# Patient Record
Sex: MALE | URBAN - METROPOLITAN AREA
[De-identification: names, ages, dates, MRNs, and addresses within clinical notes are randomized per-mention and may not be internally consistent; named-entity substitution may affect disease eponyms.]

---

## 2023-07-25 ENCOUNTER — APPOINTMENT (RX ONLY)
Dept: URBAN - METROPOLITAN AREA CLINIC 26 | Facility: CLINIC | Age: 68
Setting detail: DERMATOLOGY
End: 2023-07-25

## 2023-07-25 DIAGNOSIS — D485 NEOPLASM OF UNCERTAIN BEHAVIOR OF SKIN: ICD-10-CM

## 2023-07-25 PROBLEM — D48.5 NEOPLASM OF UNCERTAIN BEHAVIOR OF SKIN: Status: ACTIVE | Noted: 2023-07-25

## 2023-07-25 PROCEDURE — ? EXCISION

## 2023-07-25 PROCEDURE — 11402 EXC TR-EXT B9+MARG 1.1-2 CM: CPT

## 2023-07-25 PROCEDURE — 12032 INTMD RPR S/A/T/EXT 2.6-7.5: CPT

## 2023-07-25 ASSESSMENT — LOCATION SIMPLE DESCRIPTION DERM: LOCATION SIMPLE: RIGHT LOWER BACK

## 2023-07-25 ASSESSMENT — LOCATION ZONE DERM: LOCATION ZONE: TRUNK

## 2023-07-25 ASSESSMENT — LOCATION DETAILED DESCRIPTION DERM: LOCATION DETAILED: RIGHT SUPERIOR LATERAL LOWER BACK

## 2024-06-06 ENCOUNTER — TRANSCRIBE ORDERS (OUTPATIENT)
Dept: REGISTRATION | Age: 69
End: 2024-06-06

## 2024-06-06 DIAGNOSIS — R97.20 ELEVATED PROSTATE SPECIFIC ANTIGEN (PSA): Primary | ICD-10-CM

## 2024-06-06 DIAGNOSIS — N40.1 BENIGN PROSTATIC HYPERPLASIA WITH LOWER URINARY TRACT SYMPTOMS: ICD-10-CM

## 2024-06-06 DIAGNOSIS — R35.1 NOCTURIA: ICD-10-CM

## 2024-06-07 ENCOUNTER — TRANSCRIBE ORDERS (OUTPATIENT)
Dept: SCHEDULING | Age: 69
End: 2024-06-07

## 2024-06-07 DIAGNOSIS — R97.20 ELEVATED PROSTATE SPECIFIC ANTIGEN (PSA): Primary | ICD-10-CM

## 2024-06-07 DIAGNOSIS — R35.1 NOCTURIA: ICD-10-CM

## 2024-06-07 DIAGNOSIS — N40.1 BENIGN PROSTATIC HYPERPLASIA WITH LOWER URINARY TRACT SYMPTOMS: ICD-10-CM

## 2024-06-11 ENCOUNTER — APPOINTMENT (RX ONLY)
Dept: URBAN - METROPOLITAN AREA CLINIC 374 | Facility: CLINIC | Age: 69
Setting detail: DERMATOLOGY
End: 2024-06-11

## 2024-06-11 DIAGNOSIS — L82.1 OTHER SEBORRHEIC KERATOSIS: ICD-10-CM

## 2024-06-11 DIAGNOSIS — L21.8 OTHER SEBORRHEIC DERMATITIS: ICD-10-CM

## 2024-06-11 DIAGNOSIS — D22 MELANOCYTIC NEVI: ICD-10-CM

## 2024-06-11 DIAGNOSIS — D18.0 HEMANGIOMA: ICD-10-CM

## 2024-06-11 DIAGNOSIS — L01.01 NON-BULLOUS IMPETIGO: ICD-10-CM | Status: INADEQUATELY CONTROLLED

## 2024-06-11 DIAGNOSIS — Z71.89 OTHER SPECIFIED COUNSELING: ICD-10-CM

## 2024-06-11 DIAGNOSIS — B07.8 OTHER VIRAL WARTS: ICD-10-CM | Status: INADEQUATELY CONTROLLED

## 2024-06-11 DIAGNOSIS — L81.4 OTHER MELANIN HYPERPIGMENTATION: ICD-10-CM

## 2024-06-11 PROBLEM — D22.5 MELANOCYTIC NEVI OF TRUNK: Status: ACTIVE | Noted: 2024-06-11

## 2024-06-11 PROBLEM — D18.01 HEMANGIOMA OF SKIN AND SUBCUTANEOUS TISSUE: Status: ACTIVE | Noted: 2024-06-11

## 2024-06-11 PROCEDURE — ? TREATMENT REGIMEN

## 2024-06-11 PROCEDURE — 17110 DESTRUCTION B9 LES UP TO 14: CPT

## 2024-06-11 PROCEDURE — 99214 OFFICE O/P EST MOD 30 MIN: CPT | Mod: 25

## 2024-06-11 PROCEDURE — ? LIQUID NITROGEN

## 2024-06-11 PROCEDURE — ? COUNSELING

## 2024-06-11 PROCEDURE — ? TREATMENT GOALS

## 2024-06-11 PROCEDURE — ? PHOTO-DOCUMENTATION

## 2024-06-11 PROCEDURE — ? PRESCRIPTION MEDICATION MANAGEMENT

## 2024-06-11 PROCEDURE — ? FULL BODY SKIN EXAM

## 2024-06-11 PROCEDURE — ? PRESCRIPTION

## 2024-06-11 RX ORDER — KETOCONAZOLE 20 MG/G
CREAM TOPICAL BID
Qty: 60 | Refills: 3 | Status: ERX | COMMUNITY
Start: 2024-06-11

## 2024-06-11 RX ORDER — KETOCONAZOLE 20 MG/ML
SHAMPOO, SUSPENSION TOPICAL
Qty: 120 | Refills: 2 | Status: ERX | COMMUNITY
Start: 2024-06-11

## 2024-06-11 RX ORDER — HYDROCORTISONE 25 MG/G
CREAM TOPICAL
Qty: 28 | Refills: 2 | Status: ERX | COMMUNITY
Start: 2024-06-11

## 2024-06-11 RX ORDER — MUPIROCIN 20 MG/G
1 OINTMENT TOPICAL BID
Qty: 22 | Refills: 0 | Status: ERX | COMMUNITY
Start: 2024-06-11

## 2024-06-11 RX ADMIN — KETOCONAZOLE: 20 CREAM TOPICAL at 00:00

## 2024-06-11 RX ADMIN — KETOCONAZOLE: 20 SHAMPOO, SUSPENSION TOPICAL at 00:00

## 2024-06-11 RX ADMIN — HYDROCORTISONE: 25 CREAM TOPICAL at 00:00

## 2024-06-11 RX ADMIN — MUPIROCIN 1: 20 OINTMENT TOPICAL at 00:00

## 2024-06-11 ASSESSMENT — LOCATION ZONE DERM
LOCATION ZONE: ARM
LOCATION ZONE: FACE
LOCATION ZONE: TRUNK
LOCATION ZONE: SCALP

## 2024-06-11 ASSESSMENT — LOCATION DETAILED DESCRIPTION DERM
LOCATION DETAILED: LEFT SUPERIOR PARIETAL SCALP
LOCATION DETAILED: SUPERIOR THORACIC SPINE
LOCATION DETAILED: LEFT CENTRAL MALAR CHEEK
LOCATION DETAILED: STERNUM
LOCATION DETAILED: INFERIOR THORACIC SPINE
LOCATION DETAILED: RIGHT MEDIAL FOREHEAD
LOCATION DETAILED: EPIGASTRIC SKIN
LOCATION DETAILED: RIGHT INFERIOR MEDIAL FOREHEAD
LOCATION DETAILED: RIGHT ANTERIOR SHOULDER
LOCATION DETAILED: RIGHT INFERIOR MEDIAL UPPER BACK
LOCATION DETAILED: UPPER STERNUM

## 2024-06-11 ASSESSMENT — LOCATION SIMPLE DESCRIPTION DERM
LOCATION SIMPLE: UPPER BACK
LOCATION SIMPLE: RIGHT FOREHEAD
LOCATION SIMPLE: CHEST
LOCATION SIMPLE: SCALP
LOCATION SIMPLE: ABDOMEN
LOCATION SIMPLE: RIGHT SHOULDER
LOCATION SIMPLE: LEFT CHEEK
LOCATION SIMPLE: RIGHT UPPER BACK

## 2024-06-11 NOTE — PROCEDURE: PRESCRIPTION MEDICATION MANAGEMENT
Render In Strict Bullet Format?: No
Plan: f/u 2 weeks
Detail Level: Zone
Initiate Treatment: mupirocin 2 % topical ointment BID: Apply a thin layer to the affected area of the chest BID x 5 days
Initiate Treatment: ketoconazole 2 % shampoo TP Sig: Lather into scalp at each wash, let sit for 5 minutes then rinse out. Use 2-3x/week\\n\\nketoconazole 2 % topical cream TP Frequency: BID Sig: Apply a thin layer to the face BID, mixed with hydrocortisone 2.5% cream until clear then twice weekly for maintenance\\n\\nhydrocortisone 2.5 % topical cream TP Sig: Apply a thin layer to AA of the face BID mixed with ketoconazole cream x 2 weeks when flaky/flaring, then PRN flares

## 2024-06-11 NOTE — PROCEDURE: LIQUID NITROGEN
Consent: The patient's consent was obtained including but not limited to risks of crusting, scabbing, blistering, scarring, darker or lighter pigmentary change, recurrence, incomplete removal and infection.
Render Note In Bullet Format When Appropriate: No
Detail Level: Detailed
Show Aperture Variable?: Yes
Spray Paint Text: The liquid nitrogen was applied to the skin utilizing a spray paint frosting technique.
Number Of Freeze-Thaw Cycles: 3 freeze-thaw cycles
Medical Necessity Clause: This procedure was medically necessary because the lesions that were treated were:
Duration Of Freeze Thaw-Cycle (Seconds): 3
Post-Care Instructions: I reviewed with the patient in detail post-care instructions. Patient is to wear sunprotection, and avoid picking at any of the treated lesions. Pt may apply Vaseline to crusted or scabbing areas.
Medical Necessity Information: It is in your best interest to select a reason for this procedure from the list below. All of these items fulfill various CMS LCD requirements except the new and changing color options.

## 2024-06-25 ENCOUNTER — APPOINTMENT (RX ONLY)
Dept: URBAN - METROPOLITAN AREA CLINIC 374 | Facility: CLINIC | Age: 69
Setting detail: DERMATOLOGY
End: 2024-06-25

## 2024-06-25 DIAGNOSIS — D22 MELANOCYTIC NEVI: ICD-10-CM | Status: INADEQUATELY CONTROLLED

## 2024-06-25 DIAGNOSIS — L01.01 NON-BULLOUS IMPETIGO: ICD-10-CM | Status: RESOLVED

## 2024-06-25 PROBLEM — D22.5 MELANOCYTIC NEVI OF TRUNK: Status: ACTIVE | Noted: 2024-06-25

## 2024-06-25 PROCEDURE — 99213 OFFICE O/P EST LOW 20 MIN: CPT | Mod: 25

## 2024-06-25 PROCEDURE — ? COUNSELING

## 2024-06-25 PROCEDURE — ? PRESCRIPTION MEDICATION MANAGEMENT

## 2024-06-25 PROCEDURE — 11301 SHAVE SKIN LESION 0.6-1.0 CM: CPT

## 2024-06-25 PROCEDURE — ? SHAVE REMOVAL

## 2024-06-25 PROCEDURE — ? PHOTO-DOCUMENTATION

## 2024-06-25 ASSESSMENT — LOCATION SIMPLE DESCRIPTION DERM: LOCATION SIMPLE: CHEST

## 2024-06-25 ASSESSMENT — LOCATION ZONE DERM: LOCATION ZONE: TRUNK

## 2024-06-25 ASSESSMENT — LOCATION DETAILED DESCRIPTION DERM
LOCATION DETAILED: LEFT MEDIAL INFERIOR CHEST
LOCATION DETAILED: STERNUM

## 2024-06-25 NOTE — PROCEDURE: SHAVE REMOVAL
Medical Necessity Information: It is in your best interest to select a reason for this procedure from the list below. All of these items fulfill various CMS LCD requirements except the new and changing color options.
Medical Necessity Clause: This procedure was medically necessary because the lesion that was treated was:
Lab: 6
Detail Level: Detailed
Was A Bandage Applied: Yes
Size Of Lesion In Cm (Required): 0.7
X Size Of Lesion In Cm (Optional): 0
Depth Of Shave: dermis
Biopsy Method: Dermablade
Anesthesia Type: 1% lidocaine with epinephrine
Anesthesia Volume In Cc: 0.2
Hemostasis: Aluminum Chloride
Wound Care: Petrolatum
Render Path Notes In Note?: No
Consent was obtained from the patient. The risks and benefits to therapy were discussed in detail. Specifically, the risks of infection, scarring, bleeding, prolonged wound healing, incomplete removal, allergy to anesthesia, nerve injury and recurrence were addressed. Prior to the procedure, the treatment site was clearly identified and confirmed by the patient. All components of Universal Protocol/PAUSE Rule completed.
Post-Care Instructions: I reviewed with the patient in detail post-care instructions. Patient is to keep the biopsy site dry overnight, and then apply bacitracin twice daily until healed. Patient may apply hydrogen peroxide soaks to remove any crusting.
Notification Instructions: Patient will be notified of pathology results. However, patient instructed to call the office if not contacted within 2 weeks.
Billing Type: Third-Party Bill
Lab Facility: [id number]

## 2024-06-25 NOTE — PROCEDURE: PRESCRIPTION MEDICATION MANAGEMENT
Render In Strict Bullet Format?: No
Discontinue Regimen: mupirocin 2 % topical ointment BID: Apply a thin layer to the affected area of the chest BID x 5 days
Detail Level: Zone

## 2024-06-27 ENCOUNTER — HOSPITAL ENCOUNTER (OUTPATIENT)
Dept: RADIOLOGY | Age: 69
Discharge: HOME | End: 2024-06-27
Attending: UROLOGY
Payer: MEDICARE

## 2024-06-27 DIAGNOSIS — R35.1 NOCTURIA: ICD-10-CM

## 2024-06-27 DIAGNOSIS — R97.20 ELEVATED PROSTATE SPECIFIC ANTIGEN (PSA): ICD-10-CM

## 2024-06-27 DIAGNOSIS — N40.1 BENIGN PROSTATIC HYPERPLASIA WITH LOWER URINARY TRACT SYMPTOMS: ICD-10-CM

## 2024-06-27 RX ORDER — GADOBUTROL 604.72 MG/ML
7 INJECTION INTRAVENOUS ONCE
Status: COMPLETED | OUTPATIENT
Start: 2024-06-27 | End: 2024-06-27

## 2024-06-27 RX ADMIN — GADOBUTROL 7 ML: 604.72 INJECTION INTRAVENOUS at 10:46

## 2024-06-27 NOTE — PROGRESS NOTES
"Josse Vera   [id number]    Your doctor has referred you for an MRI examination that requires the injection of a contrast material into your bloodstream. This contrast material, sometimes referred to as \"x-ray dye\" allows for better interpretation and results in a more accurate interpretation of the examination.     Without the use of contrast, the examination may be less informative and result in a suboptimal examination, and possibly a delay in diagnosis and, if needed, treatment.     The contrast material is given through a small needle or catheter placed into a vein, usually on the inside of the elbow, on the back of hand, or in a vein in the foot or lower leg.    The most common, though still rare, potential reaction to an intravenous contrast injection is an allergic-like reaction. Most allergic-like reactions are mild, though a small subset of people can have more severe reactions. Mild reactions include mild / scattered hives, itching, scratchy throat, sneezing and nasal congestion. More severe reactions include facial swelling, severe difficulty breathing, wheezing and anaphylactic shock. Those with a prior history allergic-like reaction to the same class of contrast media (such as CT contrast or MRI contrast) are at the highest risk for an allergic reaction.     If you believe you had an allergic reaction to contrast in the past, please let our staff know. We can determine if this increases your risk for a future reaction and provide steps to decrease the risk. This may delay your examination, but it decreases the risk of having a new and possibly more severe reaction to the contrast injection.    People with a history of prior allergic reactions to other substances (such as unrelated medications and food) and patients with a history of asthma have slightly increased risk for an allergic reaction from contrast material when compared with the general population, but do not require to be pretreated " prior to a contrast injection.    You should notify the physician, nurse or technologist if you have ever had any of these conditions or if you have any questions.

## 2024-07-11 ENCOUNTER — HOSPITAL ENCOUNTER (OUTPATIENT)
Dept: RADIOLOGY | Facility: CLINIC | Age: 69
Discharge: HOME | End: 2024-07-11
Attending: UROLOGY
Payer: MEDICARE

## 2024-07-11 DIAGNOSIS — R93.49 ABNORMAL RADIOLOGIC FINDINGS ON DIAGNOSTIC IMAGING OF OTHER URINARY ORGANS: ICD-10-CM

## 2024-09-09 ENCOUNTER — APPOINTMENT (RX ONLY)
Dept: URBAN - METROPOLITAN AREA CLINIC 374 | Facility: CLINIC | Age: 69
Setting detail: DERMATOLOGY
End: 2024-09-09

## 2024-09-09 DIAGNOSIS — B35.4 TINEA CORPORIS: ICD-10-CM | Status: INADEQUATELY CONTROLLED

## 2024-09-09 DIAGNOSIS — L81.5 LEUKODERMA, NOT ELSEWHERE CLASSIFIED: ICD-10-CM | Status: UNCHANGED

## 2024-09-09 PROCEDURE — 99213 OFFICE O/P EST LOW 20 MIN: CPT

## 2024-09-09 PROCEDURE — ? COUNSELING

## 2024-09-09 PROCEDURE — ? PRESCRIPTION

## 2024-09-09 PROCEDURE — ? PRESCRIPTION MEDICATION MANAGEMENT

## 2024-09-09 RX ORDER — BUTENAFINE HYDROCHLORIDE 10 MG/G
CREAM TOPICAL
Qty: 60 | Refills: 2 | Status: ERX | COMMUNITY
Start: 2024-09-09

## 2024-09-09 RX ADMIN — BUTENAFINE HYDROCHLORIDE: 10 CREAM TOPICAL at 00:00

## 2024-09-09 ASSESSMENT — LOCATION DETAILED DESCRIPTION DERM
LOCATION DETAILED: SUPRAPUBIC SKIN
LOCATION DETAILED: RIGHT ANTERIOR DISTAL THIGH
LOCATION DETAILED: LEFT ANTERIOR DISTAL THIGH

## 2024-09-09 ASSESSMENT — LOCATION SIMPLE DESCRIPTION DERM
LOCATION SIMPLE: LEFT THIGH
LOCATION SIMPLE: GROIN
LOCATION SIMPLE: RIGHT THIGH

## 2024-09-09 ASSESSMENT — LOCATION ZONE DERM
LOCATION ZONE: TRUNK
LOCATION ZONE: LEG

## 2024-09-09 NOTE — PROCEDURE: PRESCRIPTION MEDICATION MANAGEMENT
Detail Level: Zone
Render In Strict Bullet Format?: No
Initiate Treatment: butenafine 1 % topical cream Apply to AA BID for 6-8 weeks

## 2024-10-23 ENCOUNTER — APPOINTMENT (RX ONLY)
Dept: URBAN - METROPOLITAN AREA CLINIC 374 | Facility: CLINIC | Age: 69
Setting detail: DERMATOLOGY
End: 2024-10-23

## 2024-10-23 DIAGNOSIS — B35.4 TINEA CORPORIS: ICD-10-CM | Status: RESOLVED

## 2024-10-23 DIAGNOSIS — L40.0 PSORIASIS VULGARIS: ICD-10-CM

## 2024-10-23 PROCEDURE — ? PRESCRIPTION

## 2024-10-23 PROCEDURE — ? PRESCRIPTION MEDICATION MANAGEMENT

## 2024-10-23 PROCEDURE — 99214 OFFICE O/P EST MOD 30 MIN: CPT

## 2024-10-23 PROCEDURE — ? MDM - TREATMENT GOALS

## 2024-10-23 PROCEDURE — ? COUNSELING

## 2024-10-23 RX ORDER — CLOBETASOL PROPIONATE 0.5 MG/ML
1 SHAMPOO TOPICAL
Qty: 118 | Refills: 5 | Status: ERX | COMMUNITY
Start: 2024-10-23

## 2024-10-23 RX ADMIN — CLOBETASOL PROPIONATE 1: 0.5 SHAMPOO TOPICAL at 00:00

## 2024-10-23 ASSESSMENT — LOCATION ZONE DERM
LOCATION ZONE: TRUNK
LOCATION ZONE: SCALP
LOCATION ZONE: LEG

## 2024-10-23 ASSESSMENT — LOCATION SIMPLE DESCRIPTION DERM
LOCATION SIMPLE: HAIR
LOCATION SIMPLE: GROIN
LOCATION SIMPLE: RIGHT THIGH
LOCATION SIMPLE: LEFT SCALP

## 2024-10-23 ASSESSMENT — LOCATION DETAILED DESCRIPTION DERM
LOCATION DETAILED: LEFT MEDIAL FRONTAL SCALP
LOCATION DETAILED: HAIR
LOCATION DETAILED: SUPRAPUBIC SKIN
LOCATION DETAILED: RIGHT ANTERIOR DISTAL THIGH

## 2024-10-23 NOTE — PROCEDURE: PRESCRIPTION MEDICATION MANAGEMENT
Discontinue Regimen: butenafine 1 % topical cream Apply to AA BID for 6-8 weeks
Detail Level: Zone
Render In Strict Bullet Format?: No
Initiate Treatment: Cl[place] 0.05 % shampoo: Use as shampoo two times a week. Lather and leave on scalp for 5 minutes, then rinse off. Avoid getting into eyes.

## 2025-06-10 ENCOUNTER — APPOINTMENT (OUTPATIENT)
Dept: URBAN - METROPOLITAN AREA CLINIC 374 | Facility: CLINIC | Age: 70
Setting detail: DERMATOLOGY
End: 2025-06-10

## 2025-06-10 DIAGNOSIS — L81.4 OTHER MELANIN HYPERPIGMENTATION: ICD-10-CM

## 2025-06-10 DIAGNOSIS — L21.8 OTHER SEBORRHEIC DERMATITIS: ICD-10-CM

## 2025-06-10 DIAGNOSIS — D22 MELANOCYTIC NEVI: ICD-10-CM

## 2025-06-10 DIAGNOSIS — L82.1 OTHER SEBORRHEIC KERATOSIS: ICD-10-CM

## 2025-06-10 DIAGNOSIS — Z71.89 OTHER SPECIFIED COUNSELING: ICD-10-CM

## 2025-06-10 DIAGNOSIS — D18.0 HEMANGIOMA: ICD-10-CM

## 2025-06-10 PROBLEM — D22.5 MELANOCYTIC NEVI OF TRUNK: Status: ACTIVE | Noted: 2025-06-10

## 2025-06-10 PROBLEM — D18.01 HEMANGIOMA OF SKIN AND SUBCUTANEOUS TISSUE: Status: ACTIVE | Noted: 2025-06-10

## 2025-06-10 PROCEDURE — ? PRESCRIPTION

## 2025-06-10 PROCEDURE — ? COUNSELING

## 2025-06-10 PROCEDURE — ? TREATMENT GOALS

## 2025-06-10 PROCEDURE — ? PRESCRIPTION MEDICATION MANAGEMENT

## 2025-06-10 PROCEDURE — ? FULL BODY SKIN EXAM

## 2025-06-10 PROCEDURE — ? TREATMENT REGIMEN

## 2025-06-10 RX ORDER — KETOCONAZOLE 20 MG/G
CREAM TOPICAL BID
Qty: 60 | Refills: 3 | Status: ERX

## 2025-06-10 RX ORDER — KETOCONAZOLE 20 MG/ML
SHAMPOO, SUSPENSION TOPICAL
Qty: 120 | Refills: 2 | Status: ERX

## 2025-06-10 RX ORDER — HYDROCORTISONE 25 MG/G
CREAM TOPICAL
Qty: 28 | Refills: 2 | Status: ERX

## 2025-06-10 ASSESSMENT — LOCATION SIMPLE DESCRIPTION DERM
LOCATION SIMPLE: LEFT CHEEK
LOCATION SIMPLE: RIGHT SHOULDER
LOCATION SIMPLE: RIGHT FOREHEAD
LOCATION SIMPLE: UPPER BACK
LOCATION SIMPLE: ABDOMEN
LOCATION SIMPLE: RIGHT UPPER BACK
LOCATION SIMPLE: CHEST
LOCATION SIMPLE: SCALP

## 2025-06-10 ASSESSMENT — LOCATION DETAILED DESCRIPTION DERM
LOCATION DETAILED: UPPER STERNUM
LOCATION DETAILED: LEFT CENTRAL MALAR CHEEK
LOCATION DETAILED: RIGHT INFERIOR MEDIAL UPPER BACK
LOCATION DETAILED: LEFT SUPERIOR PARIETAL SCALP
LOCATION DETAILED: RIGHT ANTERIOR SHOULDER
LOCATION DETAILED: RIGHT INFERIOR MEDIAL FOREHEAD
LOCATION DETAILED: EPIGASTRIC SKIN
LOCATION DETAILED: INFERIOR THORACIC SPINE
LOCATION DETAILED: RIGHT MEDIAL FOREHEAD

## 2025-06-10 ASSESSMENT — LOCATION ZONE DERM
LOCATION ZONE: ARM
LOCATION ZONE: FACE
LOCATION ZONE: SCALP
LOCATION ZONE: TRUNK

## 2025-06-10 NOTE — PROCEDURE: PRESCRIPTION MEDICATION MANAGEMENT
Continue Regimen: ketoconazole 2 % shampoo TP Sig: Lather into scalp at each wash, let sit for 5 minutes then rinse out. Use 2-3x/week\\n\\nketoconazole 2 % topical cream TP Frequency: BID Sig: Apply a thin layer to the face BID, mixed with hydrocortisone 2.5% cream until clear then twice weekly for maintenance\\n\\nhydrocortisone 2.5 % topical cream TP Sig: Apply a thin layer to AA of the face BID mixed with ketoconazole cream x 2 weeks when flaky/flaring, then PRN flares
Render In Strict Bullet Format?: No
Detail Level: Zone